# Patient Record
Sex: MALE | Race: OTHER | NOT HISPANIC OR LATINO | URBAN - METROPOLITAN AREA
[De-identification: names, ages, dates, MRNs, and addresses within clinical notes are randomized per-mention and may not be internally consistent; named-entity substitution may affect disease eponyms.]

---

## 2017-09-30 ENCOUNTER — EMERGENCY (EMERGENCY)
Facility: HOSPITAL | Age: 42
LOS: 1 days | Discharge: HOME | End: 2017-09-30
Attending: EMERGENCY MEDICINE | Admitting: EMERGENCY MEDICINE
Payer: OTHER MISCELLANEOUS

## 2017-09-30 VITALS
TEMPERATURE: 98 F | HEART RATE: 104 BPM | WEIGHT: 229.94 LBS | SYSTOLIC BLOOD PRESSURE: 178 MMHG | DIASTOLIC BLOOD PRESSURE: 110 MMHG | HEIGHT: 70 IN | RESPIRATION RATE: 18 BRPM

## 2017-09-30 DIAGNOSIS — R07.81 PLEURODYNIA: ICD-10-CM

## 2017-09-30 DIAGNOSIS — S20.211A CONTUSION OF RIGHT FRONT WALL OF THORAX, INITIAL ENCOUNTER: ICD-10-CM

## 2017-09-30 DIAGNOSIS — Z91.013 ALLERGY TO SEAFOOD: ICD-10-CM

## 2017-09-30 DIAGNOSIS — E78.00 PURE HYPERCHOLESTEROLEMIA, UNSPECIFIED: ICD-10-CM

## 2017-09-30 PROCEDURE — 71101 X-RAY EXAM UNILAT RIBS/CHEST: CPT | Mod: 26,RT

## 2017-09-30 PROCEDURE — 99284 EMERGENCY DEPT VISIT MOD MDM: CPT | Mod: 25

## 2017-09-30 PROCEDURE — 99053 MED SERV 10PM-8AM 24 HR FAC: CPT

## 2017-09-30 RX ORDER — OXYCODONE AND ACETAMINOPHEN 5; 325 MG/1; MG/1
1 TABLET ORAL ONCE
Qty: 0 | Refills: 0 | Status: DISCONTINUED | OUTPATIENT
Start: 2017-09-30 | End: 2017-09-30

## 2017-09-30 RX ADMIN — OXYCODONE AND ACETAMINOPHEN 1 TABLET(S): 5; 325 TABLET ORAL at 23:58

## 2017-09-30 NOTE — ED ADULT TRIAGE NOTE - CHIEF COMPLAINT QUOTE
Pt presents to ED with c/o fall from ladder, states approx 8-8.5 feet. Denies LOC, did not strike head. Patient landed on left side, has pain with inspiration. Resps even and unlabored,  chest rise equal bilaterally. +contusion to left ribs, exquisitely ttp. O2 Saturation 98% on RA Pt presents to ED with c/o fall from ladder, states approx 8-8.5 feet. Denies LOC, did not strike head. Patient landed on right side, has pain with inspiration. Resps even and unlabored,  chest rise equal bilaterally. +contusion to right ribs, exquisitely ttp. O2 Saturation 98% on RA

## 2017-10-01 PROCEDURE — 71100 X-RAY EXAM RIBS UNI 2 VIEWS: CPT | Mod: 26,RT

## 2017-10-01 RX ORDER — OXYCODONE HYDROCHLORIDE 5 MG/1
1 TABLET ORAL
Qty: 20 | Refills: 0 | OUTPATIENT
Start: 2017-10-01 | End: 2017-10-06

## 2017-10-01 RX ORDER — OXYCODONE AND ACETAMINOPHEN 5; 325 MG/1; MG/1
1 TABLET ORAL ONCE
Qty: 0 | Refills: 0 | Status: DISCONTINUED | OUTPATIENT
Start: 2017-10-01 | End: 2017-10-01

## 2017-10-01 RX ORDER — IBUPROFEN 200 MG
1 TABLET ORAL
Qty: 30 | Refills: 0 | OUTPATIENT
Start: 2017-10-01

## 2017-10-01 RX ADMIN — OXYCODONE AND ACETAMINOPHEN 1 TABLET(S): 5; 325 TABLET ORAL at 01:09

## 2017-10-01 NOTE — ED PROVIDER NOTE - OBJECTIVE STATEMENT
Patient presenting with fall from 8.5 foot ladder- landed on R side of chest on a compressor. 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 No other complaints. No other injuries. Took Motrin 600mg PTA. + hurts to breath.

## 2017-10-01 NOTE — ED PROVIDER NOTE - DIAGNOSTIC INTERPRETATION
Interpreted by ED Physician:  CXR and R Ribs (5 view): no acute abnormality: no infiltrates, bones appear intact, cardiac silhouette wnl

## 2017-10-01 NOTE — ED ADULT NURSE NOTE - CHIEF COMPLAINT QUOTE
Pt presents to ED with c/o fall from ladder, states approx 8-8.5 feet. Denies LOC, did not strike head. Patient landed on right side, has pain with inspiration. Resps even and unlabored,  chest rise equal bilaterally. +contusion to right ribs, exquisitely ttp. O2 Saturation 98% on RA

## 2017-10-01 NOTE — ED PROVIDER NOTE - MEDICAL DECISION MAKING DETAILS
status post fall off ladder onto R chest wall with rib pain and pain with deep breath. Will give PO percocet (already took Motrin 600mg) and check rib films.

## 2021-02-12 NOTE — ED PROVIDER NOTE - CROS ED NEURO ALL NEG
negative... Ivermectin Counseling:  Patient instructed to take medication on an empty stomach with a full glass of water.  Patient informed of potential adverse effects including but not limited to nausea, diarrhea, dizziness, itching, and swelling of the extremities or lymph nodes.  The patient verbalized understanding of the proper use and possible adverse effects of ivermectin.  All of the patient's questions and concerns were addressed.